# Patient Record
Sex: FEMALE | Race: WHITE | HISPANIC OR LATINO | Employment: UNEMPLOYED | URBAN - METROPOLITAN AREA
[De-identification: names, ages, dates, MRNs, and addresses within clinical notes are randomized per-mention and may not be internally consistent; named-entity substitution may affect disease eponyms.]

---
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
## 2024-10-23 ENCOUNTER — APPOINTMENT (OUTPATIENT)
Dept: CT IMAGING | Facility: HOSPITAL | Age: 29
End: 2024-10-23
Payer: MEDICAID

## 2024-10-23 ENCOUNTER — HOSPITAL ENCOUNTER (EMERGENCY)
Facility: HOSPITAL | Age: 29
Discharge: HOME | End: 2024-10-24
Admitting: EMERGENCY MEDICINE
Payer: MEDICAID

## 2024-10-23 ENCOUNTER — APPOINTMENT (OUTPATIENT)
Dept: GENERAL RADIOLOGY | Facility: HOSPITAL | Age: 29
End: 2024-10-23
Payer: MEDICAID

## 2024-10-23 DIAGNOSIS — K76.0 HEPATIC STEATOSIS: ICD-10-CM

## 2024-10-23 DIAGNOSIS — R74.8 ELEVATED LIVER ENZYMES: ICD-10-CM

## 2024-10-23 DIAGNOSIS — R10.84 GENERALIZED ABDOMINAL PAIN: Primary | ICD-10-CM

## 2024-10-23 DIAGNOSIS — N93.9 ABNORMAL VAGINAL BLEEDING: ICD-10-CM

## 2024-10-23 DIAGNOSIS — N39.0 URINARY TRACT INFECTION WITHOUT HEMATURIA, SITE UNSPECIFIED: ICD-10-CM

## 2024-10-23 DIAGNOSIS — D72.829 LEUKOCYTOSIS, UNSPECIFIED TYPE: ICD-10-CM

## 2024-10-23 LAB
ABO GROUP BLD: NORMAL
ALBUMIN SERPL-MCNC: 4.7 G/DL (ref 3.5–5.2)
ALBUMIN/GLOB SERPL: 2 G/DL
ALP SERPL-CCNC: 67 U/L (ref 39–117)
ALT SERPL W P-5'-P-CCNC: 48 U/L (ref 1–33)
ANION GAP SERPL CALCULATED.3IONS-SCNC: 11.7 MMOL/L (ref 5–15)
AST SERPL-CCNC: 36 U/L (ref 1–32)
BACTERIA UR QL AUTO: ABNORMAL /HPF
BASOPHILS # BLD AUTO: 0.04 10*3/MM3 (ref 0–0.2)
BASOPHILS NFR BLD AUTO: 0.3 % (ref 0–1.5)
BILIRUB SERPL-MCNC: 0.2 MG/DL (ref 0–1.2)
BILIRUB UR QL STRIP: NEGATIVE
BLD GP AB SCN SERPL QL: NEGATIVE
BUN SERPL-MCNC: 9 MG/DL (ref 6–20)
BUN/CREAT SERPL: 11.8 (ref 7–25)
CALCIUM SPEC-SCNC: 10 MG/DL (ref 8.6–10.5)
CHLORIDE SERPL-SCNC: 106 MMOL/L (ref 98–107)
CLARITY UR: CLEAR
CO2 SERPL-SCNC: 22.3 MMOL/L (ref 22–29)
COLOR UR: YELLOW
CREAT SERPL-MCNC: 0.76 MG/DL (ref 0.57–1)
DEPRECATED RDW RBC AUTO: 43 FL (ref 37–54)
EGFRCR SERPLBLD CKD-EPI 2021: 108.9 ML/MIN/1.73
EOSINOPHIL # BLD AUTO: 0.18 10*3/MM3 (ref 0–0.4)
EOSINOPHIL NFR BLD AUTO: 1.5 % (ref 0.3–6.2)
ERYTHROCYTE [DISTWIDTH] IN BLOOD BY AUTOMATED COUNT: 12.9 % (ref 12.3–15.4)
GLOBULIN UR ELPH-MCNC: 2.4 GM/DL
GLUCOSE SERPL-MCNC: 86 MG/DL (ref 65–99)
GLUCOSE UR STRIP-MCNC: NEGATIVE MG/DL
HCG INTACT+B SERPL-ACNC: <1 MIU/ML
HCT VFR BLD AUTO: 47 % (ref 34–46.6)
HGB BLD-MCNC: 16.1 G/DL (ref 12–15.9)
HGB UR QL STRIP.AUTO: NEGATIVE
HOLD SPECIMEN: NORMAL
HOLD SPECIMEN: NORMAL
HYALINE CASTS UR QL AUTO: ABNORMAL /LPF
IMM GRANULOCYTES # BLD AUTO: 0.03 10*3/MM3 (ref 0–0.05)
IMM GRANULOCYTES NFR BLD AUTO: 0.2 % (ref 0–0.5)
INR PPP: 0.96 (ref 0.93–1.1)
KETONES UR QL STRIP: NEGATIVE
LEUKOCYTE ESTERASE UR QL STRIP.AUTO: ABNORMAL
LYMPHOCYTES # BLD AUTO: 3.51 10*3/MM3 (ref 0.7–3.1)
LYMPHOCYTES NFR BLD AUTO: 28.6 % (ref 19.6–45.3)
MCH RBC QN AUTO: 31 PG (ref 26.6–33)
MCHC RBC AUTO-ENTMCNC: 34.3 G/DL (ref 31.5–35.7)
MCV RBC AUTO: 90.4 FL (ref 79–97)
MONOCYTES # BLD AUTO: 0.76 10*3/MM3 (ref 0.1–0.9)
MONOCYTES NFR BLD AUTO: 6.2 % (ref 5–12)
NEUTROPHILS NFR BLD AUTO: 63.2 % (ref 42.7–76)
NEUTROPHILS NFR BLD AUTO: 7.74 10*3/MM3 (ref 1.7–7)
NITRITE UR QL STRIP: NEGATIVE
NRBC BLD AUTO-RTO: 0 /100 WBC (ref 0–0.2)
PH UR STRIP.AUTO: 8 [PH] (ref 5–8)
PLATELET # BLD AUTO: 265 10*3/MM3 (ref 140–450)
PMV BLD AUTO: 11.4 FL (ref 6–12)
POTASSIUM SERPL-SCNC: 3.8 MMOL/L (ref 3.5–5.2)
PROT SERPL-MCNC: 7.1 G/DL (ref 6–8.5)
PROT UR QL STRIP: NEGATIVE
PROTHROMBIN TIME: 10.5 SECONDS (ref 9.6–11.7)
RBC # BLD AUTO: 5.2 10*6/MM3 (ref 3.77–5.28)
RBC # UR STRIP: ABNORMAL /HPF
REF LAB TEST METHOD: ABNORMAL
RH BLD: POSITIVE
SODIUM SERPL-SCNC: 140 MMOL/L (ref 136–145)
SP GR UR STRIP: 1.01 (ref 1–1.03)
SQUAMOUS #/AREA URNS HPF: ABNORMAL /HPF
T&S EXPIRATION DATE: NORMAL
UROBILINOGEN UR QL STRIP: ABNORMAL
WBC # UR STRIP: ABNORMAL /HPF
WBC NRBC COR # BLD AUTO: 12.26 10*3/MM3 (ref 3.4–10.8)
WHOLE BLOOD HOLD COAG: NORMAL
WHOLE BLOOD HOLD SPECIMEN: NORMAL

## 2024-10-23 PROCEDURE — 99285 EMERGENCY DEPT VISIT HI MDM: CPT

## 2024-10-23 PROCEDURE — 86900 BLOOD TYPING SEROLOGIC ABO: CPT

## 2024-10-23 PROCEDURE — 25010000002 ONDANSETRON PER 1 MG

## 2024-10-23 PROCEDURE — P9612 CATHETERIZE FOR URINE SPEC: HCPCS

## 2024-10-23 PROCEDURE — 25010000002 MORPHINE PER 10 MG

## 2024-10-23 PROCEDURE — 81001 URINALYSIS AUTO W/SCOPE: CPT

## 2024-10-23 PROCEDURE — 85610 PROTHROMBIN TIME: CPT

## 2024-10-23 PROCEDURE — 87088 URINE BACTERIA CULTURE: CPT

## 2024-10-23 PROCEDURE — 96374 THER/PROPH/DIAG INJ IV PUSH: CPT

## 2024-10-23 PROCEDURE — 86901 BLOOD TYPING SEROLOGIC RH(D): CPT

## 2024-10-23 PROCEDURE — 87186 SC STD MICRODIL/AGAR DIL: CPT

## 2024-10-23 PROCEDURE — 25510000001 IOPAMIDOL PER 1 ML

## 2024-10-23 PROCEDURE — 80053 COMPREHEN METABOLIC PANEL: CPT

## 2024-10-23 PROCEDURE — 71045 X-RAY EXAM CHEST 1 VIEW: CPT

## 2024-10-23 PROCEDURE — 86850 RBC ANTIBODY SCREEN: CPT

## 2024-10-23 PROCEDURE — 87086 URINE CULTURE/COLONY COUNT: CPT

## 2024-10-23 PROCEDURE — 96375 TX/PRO/DX INJ NEW DRUG ADDON: CPT

## 2024-10-23 PROCEDURE — 84702 CHORIONIC GONADOTROPIN TEST: CPT

## 2024-10-23 PROCEDURE — 71250 CT THORAX DX C-: CPT

## 2024-10-23 PROCEDURE — 85025 COMPLETE CBC W/AUTO DIFF WBC: CPT

## 2024-10-23 PROCEDURE — 74177 CT ABD & PELVIS W/CONTRAST: CPT

## 2024-10-23 RX ORDER — SODIUM CHLORIDE 0.9 % (FLUSH) 0.9 %
10 SYRINGE (ML) INJECTION AS NEEDED
Status: DISCONTINUED | OUTPATIENT
Start: 2024-10-23 | End: 2024-10-24 | Stop reason: HOSPADM

## 2024-10-23 RX ORDER — IOPAMIDOL 755 MG/ML
100 INJECTION, SOLUTION INTRAVASCULAR
Status: COMPLETED | OUTPATIENT
Start: 2024-10-23 | End: 2024-10-23

## 2024-10-23 RX ORDER — ONDANSETRON 2 MG/ML
4 INJECTION INTRAMUSCULAR; INTRAVENOUS ONCE
Status: COMPLETED | OUTPATIENT
Start: 2024-10-23 | End: 2024-10-23

## 2024-10-23 RX ADMIN — IOPAMIDOL 100 ML: 755 INJECTION, SOLUTION INTRAVENOUS at 23:02

## 2024-10-23 RX ADMIN — MORPHINE SULFATE 4 MG: 4 INJECTION, SOLUTION INTRAMUSCULAR; INTRAVENOUS at 23:08

## 2024-10-23 RX ADMIN — ONDANSETRON 4 MG: 2 INJECTION, SOLUTION INTRAMUSCULAR; INTRAVENOUS at 23:08

## 2024-10-24 VITALS
SYSTOLIC BLOOD PRESSURE: 120 MMHG | OXYGEN SATURATION: 97 % | HEART RATE: 73 BPM | RESPIRATION RATE: 18 BRPM | DIASTOLIC BLOOD PRESSURE: 72 MMHG | TEMPERATURE: 98.2 F | HEIGHT: 62 IN | WEIGHT: 216.27 LBS | BODY MASS INDEX: 39.8 KG/M2

## 2024-10-24 RX ORDER — NORGESTIMATE AND ETHINYL ESTRADIOL 7DAYSX3 28
1 KIT ORAL DAILY
Qty: 360 TABLET | Refills: 0 | Status: SHIPPED | OUTPATIENT
Start: 2024-10-24 | End: 2025-10-24

## 2024-10-24 RX ORDER — NICOTINE 21 MG/24HR
1 PATCH, TRANSDERMAL 24 HOURS TRANSDERMAL
Status: DISCONTINUED | OUTPATIENT
Start: 2024-10-24 | End: 2024-10-24 | Stop reason: HOSPADM

## 2024-10-24 RX ORDER — OXYCODONE HYDROCHLORIDE 5 MG/1
5 TABLET ORAL ONCE
Status: COMPLETED | OUTPATIENT
Start: 2024-10-24 | End: 2024-10-24

## 2024-10-24 RX ORDER — OXYCODONE HYDROCHLORIDE 5 MG/1
5 TABLET ORAL EVERY 6 HOURS PRN
Qty: 12 TABLET | Refills: 0 | Status: SHIPPED | OUTPATIENT
Start: 2024-10-24

## 2024-10-24 RX ORDER — NICOTINE 21 MG/24HR
1 PATCH, TRANSDERMAL 24 HOURS TRANSDERMAL EVERY 24 HOURS
Qty: 14 PATCH | Refills: 0 | Status: SHIPPED | OUTPATIENT
Start: 2024-10-24 | End: 2024-10-25 | Stop reason: SDUPTHER

## 2024-10-24 RX ADMIN — OXYCODONE 5 MG: 5 TABLET ORAL at 00:52

## 2024-10-24 RX ADMIN — NICOTINE 1 PATCH: 14 PATCH, EXTENDED RELEASE TRANSDERMAL at 00:52

## 2024-10-24 RX ADMIN — AMOXICILLIN AND CLAVULANATE POTASSIUM 1 TABLET: 875; 125 TABLET, FILM COATED ORAL at 00:52

## 2024-10-24 NOTE — DISCHARGE INSTRUCTIONS
You were evaluated in the emergency department today for your abnormal vaginal bleeding.  Labs and imaging indicate no known cause.  Incidental findings were fatty liver disease and early lung disease from smoking.  Please stop smoking immediately.  I have prescribed nicotine patches to help with this.  Take all medications as prescribed.    Follow-up with OB/GYN.  Keep your appointment in December however I encourage you to call the number listed below to see if they can get you in sooner.  Can see anyone in their office.  Follow-up with gastroenterology, call the number below to schedule an appointment.    Return to the ER for any new or worsening symptoms.

## 2024-10-24 NOTE — ED PROVIDER NOTES
Subjective   Chief Complaint   Patient presents with    Vaginal Bleeding       History of Present Illness  Patient is a 29-year-old female who reports that she has had vaginal bleeding off and on for 3 months.  Now passing clots the size of ping-pong balls.  Previously she only had 1-2 periods per year however she is having much more than that now.  Reports that she is very tired as well as having some blood pressure issues now.  Reports history of HPV biopsy that was noncancerous however has not seen an OB/GYN in 2 years.  She does have 2 kids reports a tubal ligation.  Reporting that she is having pain and nausea to help with this.  States that she tried to call her OB/GYN to make an appointment however they told her that they could not get her in until December.  Review of Systems  Per HPI  Past Medical History:   Diagnosis Date    Anxiety     Depression     Diabetes mellitus     Gestational       Allergies   Allergen Reactions    Cephalosporins Itching    Hydrocodone Rash       Past Surgical History:   Procedure Laterality Date    BREAST ABSCESS INCISION AND DRAINAGE         Family History   Problem Relation Age of Onset    Alo Parkinson White syndrome Mother     Anxiety disorder Mother     Depression Mother     Alo Parkinson White syndrome Sister         Bipolar    Heart disease Sister        Social History     Socioeconomic History    Marital status:    Tobacco Use    Smoking status: Every Day     Current packs/day: 0.00     Types: Cigarettes     Start date:      Last attempt to quit: 2017     Years since quittin.8    Smokeless tobacco: Never   Substance and Sexual Activity    Alcohol use: Yes     Comment: occ     Drug use: Never    Sexual activity: Yes     Partners: Male           Objective   Physical Exam  Vitals and nursing note reviewed.   Constitutional:       General: She is not in acute distress.     Appearance: Normal appearance. She is obese. She is not ill-appearing,  "toxic-appearing or diaphoretic.   HENT:      Head: Normocephalic and atraumatic.      Right Ear: Ear canal and external ear normal.      Left Ear: Ear canal and external ear normal.      Nose: Nose normal.      Mouth/Throat:      Mouth: Mucous membranes are moist.      Pharynx: Oropharynx is clear.   Eyes:      Extraocular Movements: Extraocular movements intact.      Conjunctiva/sclera: Conjunctivae normal.      Pupils: Pupils are equal, round, and reactive to light.   Cardiovascular:      Rate and Rhythm: Normal rate and regular rhythm.      Pulses: Normal pulses.      Heart sounds: Normal heart sounds.   Pulmonary:      Effort: Pulmonary effort is normal.      Breath sounds: Wheezing present.   Abdominal:      General: Bowel sounds are normal.      Palpations: Abdomen is soft.   Genitourinary:     Vagina: Vaginal discharge present.   Musculoskeletal:         General: Normal range of motion.      Cervical back: Normal range of motion and neck supple.   Skin:     General: Skin is warm and dry.      Capillary Refill: Capillary refill takes less than 2 seconds.   Neurological:      General: No focal deficit present.      Mental Status: She is alert.   Psychiatric:         Mood and Affect: Mood normal.         Behavior: Behavior normal.         Thought Content: Thought content normal.         Judgment: Judgment normal.         Procedures           ED Course      /72   Pulse 73   Temp 98.2 °F (36.8 °C)   Resp 18   Ht 157.5 cm (62\")   Wt 98.1 kg (216 lb 4.3 oz)   SpO2 97%   BMI 39.56 kg/m²   Labs Reviewed   COMPREHENSIVE METABOLIC PANEL - Abnormal; Notable for the following components:       Result Value    ALT (SGPT) 48 (*)     AST (SGOT) 36 (*)     All other components within normal limits    Narrative:     GFR Normal >60  Chronic Kidney Disease <60  Kidney Failure <15     URINALYSIS W/ MICROSCOPIC IF INDICATED (NO CULTURE) - Abnormal; Notable for the following components:    Leuk Esterase, UA Small (1+) " (*)     All other components within normal limits   CBC WITH AUTO DIFFERENTIAL - Abnormal; Notable for the following components:    WBC 12.26 (*)     Hemoglobin 16.1 (*)     Hematocrit 47.0 (*)     Neutrophils, Absolute 7.74 (*)     Lymphocytes, Absolute 3.51 (*)     All other components within normal limits   URINALYSIS, MICROSCOPIC ONLY - Abnormal; Notable for the following components:    WBC, UA 3-5 (*)     All other components within normal limits   PROTIME-INR - Normal   URINE CULTURE   HCG, QUANTITATIVE, PREGNANCY    Narrative:     HCG Ranges by Gestational Age    Females - non-pregnant premenopausal   </= 1mIU/mL HCG  Females - postmenopausal               </= 7mIU/mL HCG    3 Weeks       5.4   -      72 mIU/mL  4 Weeks      10.2   -     708 mIU/mL  5 Weeks       217   -   8,245 mIU/mL  6 Weeks       152   -  32,177 mIU/mL  7 Weeks     4,059   - 153,767 mIU/mL  8 Weeks    31,366   - 149,094 mIU/mL  9 Weeks    59,109   - 135,901 mIU/mL  10 Weeks   44,186   - 170,409 mIU/mL  12 Weeks   27,107   - 201,615 mIU/mL  14 Weeks   24,302   -  93,646 mIU/mL  15 Weeks   12,540   -  69,747 mIU/mL  16 Weeks    8,904   -  55,332 mIU/mL  17 Weeks    8,240   -  51,793 mIU/mL  18 Weeks    9,649   -  55,271 mIU/mL     RAINBOW DRAW    Narrative:     The following orders were created for panel order [place] Draw.  Procedure                               Abnormality         Status                     ---------                               -----------         ------                     Green Top (Gel)[[id number]]                                  Final result               Lavender Top[[id number]]                                     Final result               Gold Top - SST[[id number]]                                   Final result               Light Blue Top[[id number]]                                   Final result                 Please view results for these tests on the individual orders.   TYPE AND SCREEN   CBC AND DIFFERENTIAL     Narrative:     The following orders were created for panel order CBC & Differential.  Procedure                               Abnormality         Status                     ---------                               -----------         ------                     CBC Auto Differential[[id number]]        Abnormal            Final result                 Please view results for these tests on the individual orders.   GREEN TOP   LAVENDER TOP   GOLD TOP - SST   LIGHT BLUE TOP     .eds  CT Chest Without Contrast Diagnostic    Result Date: 10/23/2024  Impression: No suspicious pulmonary nodules. Multiple calcified granulomas seen. Mosaic attenuation of the lungs may be due to small airways disease or may be due to inspiratory phase. Consider follow-up CT in 2-3 months to further evaluate. Few prominent mediastinal lymph nodes, likely reactive. Hepatic steatosis and small left lipid rich adrenal adenoma. Please see CT abdomen pelvis report for full abdominal findings. Electronically Signed: Riaz Whyte MD  10/23/2024 11:33 PM EDT  Workstation ID: [id number]    CT Abdomen Pelvis With Contrast    Result Date: 10/23/2024  Impression: 1.No acute abdominal or pelvic abnormality. 2.Small hiatal hernia. Electronically Signed: Mohsen Valencia MD  10/23/2024 11:15 PM EDT  Workstation ID: [id number]    XR Chest 1 View    Result Date: 10/23/2024  Impression: Vague rounded opacity projecting over left heart border of uncertain etiology. Consider dedicated chest CT for further assessment. Electronically Signed: Bradford Garcia MD  10/23/2024 9:31 PM EDT  Workstation ID: [id number]                                            Medical Decision Making  Patient presented with normal vaginal bleeding.  History obtained from patient.    EKG reviewed: Not indicated    Labs reviewed:   Labs Reviewed   COMPREHENSIVE METABOLIC PANEL - Abnormal; Notable for the following components:       Result Value    ALT (SGPT) 48 (*)     AST (SGOT) 36 (*)     All  other components within normal limits    Narrative:     GFR Normal >60  Chronic Kidney Disease <60  Kidney Failure <15     URINALYSIS W/ MICROSCOPIC IF INDICATED (NO CULTURE) - Abnormal; Notable for the following components:    Leuk Esterase, UA Small (1+) (*)     All other components within normal limits   CBC WITH AUTO DIFFERENTIAL - Abnormal; Notable for the following components:    WBC 12.26 (*)     Hemoglobin 16.1 (*)     Hematocrit 47.0 (*)     Neutrophils, Absolute 7.74 (*)     Lymphocytes, Absolute 3.51 (*)     All other components within normal limits   URINALYSIS, MICROSCOPIC ONLY - Abnormal; Notable for the following components:    WBC, UA 3-5 (*)     All other components within normal limits   PROTIME-INR - Normal   URINE CULTURE   HCG, QUANTITATIVE, PREGNANCY    Narrative:     HCG Ranges by Gestational Age    Females - non-pregnant premenopausal   </= 1mIU/mL HCG  Females - postmenopausal               </= 7mIU/mL HCG    3 Weeks       5.4   -      72 mIU/mL  4 Weeks      10.2   -     708 mIU/mL  5 Weeks       217   -   8,245 mIU/mL  6 Weeks       152   -  32,177 mIU/mL  7 Weeks     4,059   - 153,767 mIU/mL  8 Weeks    31,366   - 149,094 mIU/mL  9 Weeks    59,109   - 135,901 mIU/mL  10 Weeks   44,186   - 170,409 mIU/mL  12 Weeks   27,107   - 201,615 mIU/mL  14 Weeks   24,302   -  93,646 mIU/mL  15 Weeks   12,540   -  69,747 mIU/mL  16 Weeks    8,904   -  55,332 mIU/mL  17 Weeks    8,240   -  51,793 mIU/mL  18 Weeks    9,649   -  55,271 mIU/mL     RAINBOW DRAW    Narrative:     The following orders were created for panel order [place] Draw.  Procedure                               Abnormality         Status                     ---------                               -----------         ------                     Green Top (Gel)[[id number]]                                  Final result               Lavender Top[[id number]]                                     Final result               Gold Top -  SST[[id number]]                                   Final result               Light Blue Top[[id number]]                                   Final result                 Please view results for these tests on the individual orders.   TYPE AND SCREEN   CBC AND DIFFERENTIAL    Narrative:     The following orders were created for panel order CBC & Differential.  Procedure                               Abnormality         Status                     ---------                               -----------         ------                     CBC Auto Differential[[id number]]        Abnormal            Final result                 Please view results for these tests on the individual orders.   GREEN TOP   LAVENDER TOP   GOLD TOP - SST   LIGHT BLUE TOP         Imaging reviewed: CT Chest Without Contrast Diagnostic    Result Date: 10/23/2024  Impression: No suspicious pulmonary nodules. Multiple calcified granulomas seen. Mosaic attenuation of the lungs may be due to small airways disease or may be due to inspiratory phase. Consider follow-up CT in 2-3 months to further evaluate. Few prominent mediastinal lymph nodes, likely reactive. Hepatic steatosis and small left lipid rich adrenal adenoma. Please see CT abdomen pelvis report for full abdominal findings. Electronically Signed: Riza Whyte MD  10/23/2024 11:33 PM EDT  Workstation ID: [id number]    CT Abdomen Pelvis With Contrast    Result Date: 10/23/2024  Impression: 1.No acute abdominal or pelvic abnormality. 2.Small hiatal hernia. Electronically Signed: Mohsen Valencia MD  10/23/2024 11:15 PM EDT  Workstation ID: [id number]    XR Chest 1 View    Result Date: 10/23/2024  Impression: Vague rounded opacity projecting over left heart border of uncertain etiology. Consider dedicated chest CT for further assessment. Electronically Signed: Bradford Garcia MD  10/23/2024 9:31 PM EDT  Workstation ID: [id number]     No relevant internal or external records    Differential diagnosis  considered: Uterine fibroid, tubal pregnancy, PCOS    Patient was treated with morphine Zofran Augmentin and Roxicodone and had improvement in symptoms.    Upon evaluation of patient IV lab and imaging were obtained.  Imaging as above.  CBC shows white count 12.26 and hemoglobin of 16.1.  PT/INR normal hCG less than 1 CMP shows mildly elevated liver enzymes patient is O+ urine culture pending urinalysis does show 1+ leukocytes and 3-5 white blood cells.  As patient is symptomatic with lower abdominal pain and some burning on urination we will treat pending urine culture.  Discussed these results with patient.  As she is trying to get into her primary care provider and/or her OB/GYN and is unsuccessful we will send her home on short course of birth control to help regulate abnormal cycles as she had relief from the abnormal bleeding while on Ortho Tri-Cyclen.  Given short course of pain medication.  Given antibiotics pending urine culture results for urinalysis that she is symptomatic.  Given information on smoking cessation as well as nicotine patches.  Discussed discharge follow-up and return to ED instructions with patient who expresses understanding.  Consideration was given for admission, but the patient was stable for outpatient management as patient was ambulatory, nontoxic, stable, and afebrile.  Exam as above.    Disposition: Discussed need to follow-up diagnostics, including incidental findings.  Discharged with instructions to obtain outpatient follow-up with patient's symptoms and findings, with strict return precautions if patient develops new or worsening symptoms.    This document is intended for medical expert use only. Reading of this document by patients and/or patient's family without participating medical staff guidance may result in misinterpretation and unintended morbidity.  Any interpretation of such data is the responsibility of the patient and/or family member responsible for the patient in  concert with their primary or specialist providers, not to be left for sources of online searches such as [employer], [employer] or similar queries. Relying on these approaches to knowledge may result in misinterpretation, misguided goals of care and even death should patients or family members try recommendations outside of the realm of professional medical care in a supervised inpatient environment.       Final diagnoses:   Generalized abdominal pain   Abnormal vaginal bleeding   Urinary tract infection without hematuria, site unspecified   Elevated liver enzymes   Leukocytosis, unspecified type   Hepatic steatosis       ED Disposition  ED Disposition       ED Disposition   Discharge    Condition   Stable    Comment   --               Daya Santoyo, APRN  [address] [place]  [address]  [place] [address] [phone]          GASTROENTEROLOGY [facility]  [address] [address] [phone]        Megan Ko MD  [facility] [facility] [place] [address] [phone]               Medication List        New Prescriptions      amoxicillin-clavulanate 875-125 MG per tablet  Commonly known as: AUGMENTIN  Take 1 tablet by mouth 2 (Two) Times a Day for 7 days.     nicotine 14 MG/24HR patch  Commonly known as: Nicoderm CQ  Place 1 patch on the skin as directed by provider Daily for 14 days.     norgestimate-ethinyl estradiol 0.18/0.215/0.25 MG-35 MCG per tablet  Commonly known as: ORTHO TRI-CYCLEN,TRINESSA  Take 1 tablet by mouth Daily.     oxyCODONE 5 MG immediate release tablet  Commonly known as: Roxicodone  Take 1 tablet by mouth Every 6 (Six) Hours As Needed for Moderate Pain for up to 12 doses.               Where to Get Your Medications        These medications were sent to [facility] - [place], [place] [address] - [phone]  - [phone] FX  [address]  [place] [address]      Phone: [phone]   amoxicillin-clavulanate 875-125 MG per tablet  nicotine 14 MG/24HR  patch  norgestimate-ethinyl estradiol 0.18/0.215/0.25 MG-35 MCG per tablet  oxyCODONE 5 MG immediate release tablet            Shannan Sheldon, APRN  10/24/24 8962

## 2024-10-25 ENCOUNTER — OFFICE VISIT (OUTPATIENT)
Dept: FAMILY MEDICINE CLINIC | Facility: CLINIC | Age: 29
End: 2024-10-25
Payer: MEDICAID

## 2024-10-25 VITALS
WEIGHT: 218.2 LBS | BODY MASS INDEX: 40.15 KG/M2 | OXYGEN SATURATION: 96 % | HEIGHT: 62 IN | SYSTOLIC BLOOD PRESSURE: 114 MMHG | HEART RATE: 89 BPM | TEMPERATURE: 97.4 F | DIASTOLIC BLOOD PRESSURE: 88 MMHG | RESPIRATION RATE: 18 BRPM

## 2024-10-25 DIAGNOSIS — D35.00 ADRENAL ADENOMA, UNSPECIFIED LATERALITY: ICD-10-CM

## 2024-10-25 DIAGNOSIS — J98.4 CALCIFIED GRANULOMA OF LUNG: ICD-10-CM

## 2024-10-25 DIAGNOSIS — Z76.89 ENCOUNTER TO ESTABLISH CARE: Primary | ICD-10-CM

## 2024-10-25 DIAGNOSIS — F90.9 ATTENTION DEFICIT HYPERACTIVITY DISORDER (ADHD), UNSPECIFIED ADHD TYPE: ICD-10-CM

## 2024-10-25 DIAGNOSIS — R13.10 DYSPHAGIA, UNSPECIFIED TYPE: ICD-10-CM

## 2024-10-25 DIAGNOSIS — N92.1 MENORRHAGIA WITH IRREGULAR CYCLE: ICD-10-CM

## 2024-10-25 DIAGNOSIS — R93.89 OPACITY NOTED ON IMAGING STUDY: ICD-10-CM

## 2024-10-25 DIAGNOSIS — Z72.0 TOBACCO ABUSE: ICD-10-CM

## 2024-10-25 DIAGNOSIS — D22.9 MULTIPLE ATYPICAL SKIN MOLES: ICD-10-CM

## 2024-10-25 DIAGNOSIS — F31.60 BIPOLAR AFFECTIVE DISORDER, CURRENT EPISODE MIXED, CURRENT EPISODE SEVERITY UNSPECIFIED: ICD-10-CM

## 2024-10-25 DIAGNOSIS — K44.9 HIATAL HERNIA: ICD-10-CM

## 2024-10-25 DIAGNOSIS — Z91.09 ENVIRONMENTAL ALLERGIES: ICD-10-CM

## 2024-10-25 DIAGNOSIS — Z91.410 HISTORY OF DOMESTIC PHYSICAL ABUSE IN ADULT: ICD-10-CM

## 2024-10-25 DIAGNOSIS — Z13.29 SCREENING FOR THYROID DISORDER: ICD-10-CM

## 2024-10-25 DIAGNOSIS — G89.29 NECK PAIN, CHRONIC: ICD-10-CM

## 2024-10-25 DIAGNOSIS — K76.0 HEPATIC STEATOSIS: ICD-10-CM

## 2024-10-25 DIAGNOSIS — Z13.21 ENCOUNTER FOR VITAMIN DEFICIENCY SCREENING: ICD-10-CM

## 2024-10-25 DIAGNOSIS — Z11.59 ENCOUNTER FOR HEPATITIS C SCREENING TEST FOR LOW RISK PATIENT: ICD-10-CM

## 2024-10-25 DIAGNOSIS — M54.2 NECK PAIN, CHRONIC: ICD-10-CM

## 2024-10-25 DIAGNOSIS — R59.0 MEDIASTINAL LYMPHADENOPATHY: ICD-10-CM

## 2024-10-25 DIAGNOSIS — N64.4 BREAST PAIN, LEFT: ICD-10-CM

## 2024-10-25 DIAGNOSIS — F41.9 ANXIETY: ICD-10-CM

## 2024-10-25 DIAGNOSIS — Z13.1 SCREENING FOR DIABETES MELLITUS: ICD-10-CM

## 2024-10-25 DIAGNOSIS — F32.A DEPRESSION, UNSPECIFIED DEPRESSION TYPE: ICD-10-CM

## 2024-10-25 DIAGNOSIS — Q17.0 PREAURICULAR SKIN TAG: ICD-10-CM

## 2024-10-25 DIAGNOSIS — L60.8 TOENAIL DEFORMITY: ICD-10-CM

## 2024-10-25 DIAGNOSIS — Z13.220 SCREENING FOR LIPID DISORDERS: ICD-10-CM

## 2024-10-25 PROCEDURE — 99204 OFFICE O/P NEW MOD 45 MIN: CPT

## 2024-10-25 PROCEDURE — 1126F AMNT PAIN NOTED NONE PRSNT: CPT

## 2024-10-25 RX ORDER — CARIPRAZINE 1.5 MG/1
1.5 CAPSULE, GELATIN COATED ORAL
Qty: 30 CAPSULE | Refills: 0 | Status: SHIPPED | OUTPATIENT
Start: 2024-10-25

## 2024-10-25 RX ORDER — NICOTINE 21 MG/24HR
1 PATCH, TRANSDERMAL 24 HOURS TRANSDERMAL EVERY 24 HOURS
Qty: 14 PATCH | Refills: 0 | Status: SHIPPED | OUTPATIENT
Start: 2024-10-25 | End: 2024-11-08

## 2024-10-26 LAB — BACTERIA SPEC AEROBE CULT: ABNORMAL

## 2024-10-26 NOTE — PROGRESS NOTES
Patient urine culture resulted with E. coli. Susceptible to select penicillins, see the report below. Patient was given Rx for amoxicillin-clavulanic acid. Therapy is appropriate coverage. No further follow-up required..    Microbiology Results (last 10 days)       Procedure Component Value - Date/Time    Urine Culture - Urine, Straight Cath [[id number]]  (Abnormal)  (Susceptibility) Collected: 10/23/24 2202    Lab Status: Final result Specimen: Urine from Straight Cath Updated: 10/26/24 1132     Urine Culture 50,000 CFU/mL Escherichia coli    Narrative:      Colonization of the urinary tract without infection is common. Treatment is discouraged unless the patient is symptomatic, pregnant, or undergoing an invasive urologic procedure.    Susceptibility        Escherichia coli      RAFFAELE      Amoxicillin + Clavulanate 4 ug/ml Susceptible      Ampicillin >=32 ug/ml Resistant      Ampicillin + Sulbactam 16 ug/ml Intermediate      Cefazolin <=4 ug/ml Susceptible      Cefepime <=1 ug/ml Susceptible      Ceftazidime <=1 ug/ml Susceptible      Ceftriaxone <=1 ug/ml Susceptible      Gentamicin <=1 ug/ml Susceptible      Levofloxacin <=0.12 ug/ml Susceptible      Nitrofurantoin <=16 ug/ml Susceptible      Piperacillin + Tazobactam <=4 ug/ml Susceptible      Trimethoprim + Sulfamethoxazole <=20 ug/ml Susceptible                                   Yareli Bridges, PharmJOE  10/26/2024 15:38 EDT

## 2024-10-30 LAB
CHOLEST SERPL-MCNC: 227 MG/DL (ref 100–199)
HBA1C MFR BLD: 5.6 % (ref 4.8–5.6)
HCV IGG SERPL QL IA: NON REACTIVE
HDLC SERPL-MCNC: 36 MG/DL
LDLC SERPL CALC-MCNC: 136 MG/DL (ref 0–99)
TRIGL SERPL-MCNC: 306 MG/DL (ref 0–149)
TSH SERPL DL<=0.005 MIU/L-ACNC: 2.52 UIU/ML (ref 0.45–4.5)
VLDLC SERPL CALC-MCNC: 55 MG/DL (ref 5–40)

## 2024-11-27 DIAGNOSIS — F31.60 BIPOLAR AFFECTIVE DISORDER, CURRENT EPISODE MIXED, CURRENT EPISODE SEVERITY UNSPECIFIED: ICD-10-CM

## 2024-11-27 RX ORDER — CARIPRAZINE 1.5 MG/1
1.5 CAPSULE, GELATIN COATED ORAL
Qty: 30 CAPSULE | Refills: 0 | Status: SHIPPED | OUTPATIENT
Start: 2024-11-27

## 2024-11-27 NOTE — TELEPHONE ENCOUNTER
Caller: Mallika Duarte    Relationship: Self    Best call back number: [phone]    Requested Prescriptions:   Requested Prescriptions     Pending Prescriptions Disp Refills    Vraylar 1.5 MG capsule capsule 30 capsule 0     Sig: Take 1 capsule by mouth every night at bedtime.      Pharmacy where request should be sent: [facility] [address] [place] [place] [address] [address] - [phone]  - [phone] FX     Last office visit with prescribing clinician: 10/25/2024   Last telemedicine visit with prescribing clinician: Visit date not found   Next office visit with prescribing clinician: 12/16/2024     Additional details provided by patient: PT HAS 1 DAY LEFT OF MEDICATION.     Does the patient have less than a 3 day supply:  [x] Yes  [] No    Would you like a call back once the refill request has been completed: [] Yes [x] No    Lexi Curtis Rep   11/27/24 10:31 EST

## 2024-12-10 ENCOUNTER — TELEPHONE (OUTPATIENT)
Dept: ORTHOPEDIC SURGERY | Facility: CLINIC | Age: 29
End: 2024-12-10
Payer: MEDICAID

## 2024-12-16 ENCOUNTER — OFFICE VISIT (OUTPATIENT)
Dept: FAMILY MEDICINE CLINIC | Facility: CLINIC | Age: 29
End: 2024-12-16
Payer: MEDICAID

## 2024-12-16 VITALS
HEIGHT: 62 IN | DIASTOLIC BLOOD PRESSURE: 76 MMHG | BODY MASS INDEX: 40.01 KG/M2 | OXYGEN SATURATION: 98 % | SYSTOLIC BLOOD PRESSURE: 116 MMHG | RESPIRATION RATE: 18 BRPM | WEIGHT: 217.4 LBS | TEMPERATURE: 97 F | HEART RATE: 75 BPM

## 2024-12-16 DIAGNOSIS — D75.1 ERYTHROCYTOSIS: ICD-10-CM

## 2024-12-16 DIAGNOSIS — K21.9 GASTROESOPHAGEAL REFLUX DISEASE, UNSPECIFIED WHETHER ESOPHAGITIS PRESENT: ICD-10-CM

## 2024-12-16 DIAGNOSIS — K76.0 HEPATIC STEATOSIS: ICD-10-CM

## 2024-12-16 DIAGNOSIS — R10.12 LUQ ABDOMINAL PAIN: ICD-10-CM

## 2024-12-16 DIAGNOSIS — R30.0 DYSURIA: ICD-10-CM

## 2024-12-16 DIAGNOSIS — F31.60 BIPOLAR AFFECTIVE DISORDER, CURRENT EPISODE MIXED, CURRENT EPISODE SEVERITY UNSPECIFIED: Primary | ICD-10-CM

## 2024-12-16 DIAGNOSIS — D35.02 ADENOMA OF LEFT ADRENAL GLAND: ICD-10-CM

## 2024-12-16 DIAGNOSIS — Z72.0 TOBACCO ABUSE: ICD-10-CM

## 2024-12-16 DIAGNOSIS — J98.4 CALCIFIED GRANULOMA OF LUNG: ICD-10-CM

## 2024-12-16 DIAGNOSIS — R59.0 MEDIASTINAL LYMPHADENOPATHY: ICD-10-CM

## 2024-12-16 LAB
BILIRUB BLD-MCNC: NEGATIVE MG/DL
CLARITY, POC: ABNORMAL
COLOR UR: YELLOW
GLUCOSE UR STRIP-MCNC: NEGATIVE MG/DL
KETONES UR QL: NEGATIVE
LEUKOCYTE EST, POC: NEGATIVE
NITRITE UR-MCNC: NEGATIVE MG/ML
PH UR: 8.5 [PH] (ref 5–8)
PROT UR STRIP-MCNC: ABNORMAL MG/DL
RBC # UR STRIP: NEGATIVE /UL
SP GR UR: 1.02 (ref 1–1.03)
UROBILINOGEN UR QL: ABNORMAL

## 2024-12-16 PROCEDURE — 1126F AMNT PAIN NOTED NONE PRSNT: CPT

## 2024-12-16 PROCEDURE — 99214 OFFICE O/P EST MOD 30 MIN: CPT

## 2024-12-16 NOTE — ASSESSMENT & PLAN NOTE
She reports that Vraylar 1.5 mg has been beneficial but is still experiencing mood swings. The dosage of Vraylar will be increased to 3 mg. She has an upcoming appointment with her psychiatrist on 12/26/2024 to discuss further management of her depression, anxiety, and ADHD. No additional medications for depression will be added at this time as her psychiatrist may adjust her treatment plan.

## 2024-12-16 NOTE — ASSESSMENT & PLAN NOTE
Previous imaging indicated small airway disease and calcified granulomas. She is advised to follow up with the pulmonologist to ensure resolution.

## 2024-12-16 NOTE — ASSESSMENT & PLAN NOTE
She has reduced her smoking from half a pack to a quarter of a pack per day. She is encouraged to continue reducing her tobacco use with the goal of complete cessation.

## 2024-12-16 NOTE — PROGRESS NOTES
Office Note     Name: Mallika Duarte    : 1995     MRN: [id number]     Chief Complaint  Medication Follow up  (Vraylar)    Subjective     History of Present Illness:  Mallika Duarte is a 29 y.o. female who presents today for medication follow up. Patient states that she feels like its working it just needs a little more. She would like to increase to 3 a day.  She is also going to psych doctor for depression, anxiety and adhd the day after acacia.  History of Present Illness    She is going to see Dr. Ratliff, psychiatry.       History of Present Illness  The patient is a 29-year-old female who presents for a follow-up on medication. At the last office visit, she established care and was prescribed Vraylar 1.5 mg. She is here to assess the efficacy of the dose. She has a history of tobacco abuse and was prescribed a 14 mg NicoDerm patch. She also has a history of insomnia and was recommended to take melatonin 10 mg every night.    She reports that Vraylar has been beneficial in managing her mood; however, she continues to experience significant mood swings. She has an upcoming appointment with Dr. Carolina, a psychiatrist, on 2024, to discuss potential treatment options for depression, anxiety, and ADHD. She has not sought mental health care for several years but is now committed to addressing these issues. She recalls being prescribed an additional medication along with Vraylar at [facility], but the name eludes her. She acknowledges experiencing suicidal ideation but has no plans to act on these thoughts, attributing her decision to refrain from self-harm to her roles as a mother and wife and her love for her family. She reports having a robust support system in place.    She has a history of acid reflux and takes Tums for relief. She experiences intermittent sharp abdominal pain below her left ribs. She is awaiting contact from a gastroenterologist regarding a follow-up on her  abdominal CT scan.    She has reduced her smoking from half a pack to a quarter of a pack per day. She is awaiting an appointment with a urologist and has not yet heard from the pulmonologist. She has a history of fatty liver disease and adrenal adenoma. She has been battling urinary tract infections (UTIs) due to prolonged bleeding but reports no urinary issues. She is under the care of [facility] [place] and has met with the nurse practitioner.    Supplemental Information  She has a history of back and neck problems since middle school, which she attributes to poor physical condition. She occasionally experiences palpitations, which she describes as feeling like a skipped heartbeat or murmur. She has significantly reduced her caffeine intake, now consuming only one cup of coffee per day, and abstains from energy drinks. She has a history of heavy alcohol consumption, which she had ceased but has recently resumed social drinking. She reports that her menstrual bleeding has ceased after a duration of 4 to 5 months.    SOCIAL HISTORY  The patient has a history of tobacco abuse and currently smokes a quarter of a pack a day. The patient has a history of heavy alcohol use, quit, but recently started socially drinking again.    MEDICATIONS  Current: Vraylar, NicoDerm patch, melatonin, Tums    Allergies   Allergen Reactions    Cephalosporins Itching    Hydrocodone Rash         Current Outpatient Medications:     nicotine (Nicoderm CQ) 7 MG/24HR patch, Place 1 patch on the skin as directed by provider Daily., Disp: 14 each, Rfl: 2    Cariprazine HCl (Vraylar) 3 MG capsule capsule, Take 1 capsule by mouth Daily., Disp: 30 capsule, Rfl: 2    omeprazole (priLOSEC) 20 MG capsule, Take 1 capsule by mouth Daily., Disp: 30 capsule, Rfl: 0    Review of Systems   Constitutional:  Negative for chills, diaphoresis, fatigue and fever.   HENT:  Negative for congestion, sore throat and swollen glands.    Respiratory:   Negative for cough.    Cardiovascular:  Negative for chest pain.   Gastrointestinal:  Positive for abdominal pain. Negative for nausea and vomiting.   Genitourinary:  Positive for dysuria.   Musculoskeletal:  Positive for myalgias and neck pain.   Skin:  Negative for rash.   Neurological:  Positive for weakness and numbness.   Psychiatric/Behavioral:  Positive for decreased concentration, self-injury (thoughts but no plan.), suicidal ideas (thoughts but no plan. she is a mother and she would not hurt her family.) and depressed mood. Negative for sleep disturbance and stress. The patient is not nervous/anxious.    All other systems reviewed and are negative.      Social History     Socioeconomic History    Marital status:    Tobacco Use    Smoking status: Every Day     Current packs/day: 0.50     Average packs/day: 0.5 packs/day for 18.0 years (9.0 ttl pk-yrs)     Types: Cigarettes     Start date: 2007     Passive exposure: Current    Smokeless tobacco: Never   Vaping Use    Vaping status: Never Used   Substance and Sexual Activity    Alcohol use: Yes     Comment: occ     Drug use: Yes     Types: Marijuana    Sexual activity: Yes     Partners: Male       Family History   Problem Relation Age of Onset    Alo Parkinson White syndrome Mother     Anxiety disorder Mother     Depression Mother     Alo Parkinson White syndrome Sister         Bipolar    Heart disease Sister     Lung cancer Paternal Grandmother            10/25/2024     3:06 PM   PHQ-2/PHQ-9 Depression Screening   Little interest or pleasure in doing things Not at all   Feeling down, depressed, or hopeless Over half   Trouble falling or staying asleep, or sleeping too much Not at all   Feeling tired or having little energy Not at all   Poor appetite or overeating Not at all   Feeling bad about yourself - or that you are a failure or have let yourself or your family down Not at all   Trouble concentrating on things, such as reading the newspaper or  "watching television Not at all   Moving or speaking so slowly that other people could have noticed? Or the opposite - being so fidgety or restless that you have been moving around a lot more than usual. Not at all   Thoughts that you would be better off dead or hurting yourself in some way Not at all   Patient Health Questionnaire-9 Score 2   How difficult have these problems made it for you to do your work, take care of things at home, or get along with other people? Not difficult at all       Fall Risk Screen:  DIOR Fall Risk Assessment has not been completed.      Objective     /76 (BP Location: Right arm, Patient Position: Sitting, Cuff Size: Large Adult)   Pulse 75   Temp 97 °F (36.1 °C) (Temporal)   Resp 18   Ht 157.5 cm (62.01\")   Wt 98.6 kg (217 lb 6.4 oz)   SpO2 98%   BMI 39.75 kg/m²     BP Readings from Last 2 Encounters:   12/16/24 116/76   10/25/24 114/88       Wt Readings from Last 2 Encounters:   12/16/24 98.6 kg (217 lb 6.4 oz)   10/25/24 99 kg (218 lb 3.2 oz)            Physical Exam  Vitals and nursing note reviewed.   Constitutional:       General: She is not in acute distress.     Appearance: Normal appearance. She is well-groomed. She is not ill-appearing.   HENT:      Head: Normocephalic and atraumatic.   Eyes:      General: Lids are normal. Vision grossly intact.   Neck:      Vascular: No carotid bruit.   Cardiovascular:      Rate and Rhythm: Normal rate and regular rhythm.      Heart sounds: Normal heart sounds.   Pulmonary:      Effort: Pulmonary effort is normal.      Breath sounds: Normal breath sounds and air entry.   Abdominal:      General: Abdomen is flat. Bowel sounds are normal. There is no distension.      Palpations: Abdomen is soft. There is no shifting dullness.      Tenderness: There is abdominal tenderness in the epigastric area and left upper quadrant.      Hernia: No hernia is present.          Comments: Slight tenderness at area palpated.   Patient denies yeah " sorry   Musculoskeletal:      Right lower leg: No edema.      Left lower leg: No edema.   Skin:     General: Skin is warm and dry.   Neurological:      Mental Status: She is alert and oriented to person, place, and time. Mental status is at baseline.   Psychiatric:         Mood and Affect: Mood normal.         Behavior: Behavior normal. Behavior is cooperative.         Thought Content: Thought content normal.       Physical Exam   Abdomen   Abdomen comments: Slight tenderness at area palpated.   Patient denies yeah sorry      Physical Exam      Result Review :       Results  Laboratory Studies  A1c was normal. White count, hemoglobin, hematocrit were slightly elevated.    Imaging  CT scan showed fatty liver, adrenal adenoma, lymph nodes, small airway disease, and calcified granulomas.    Assessment and Plan     Plan      Assessment & Plan  Bipolar affective disorder, current episode mixed, current episode severity unspecified    She reports that Vraylar 1.5 mg has been beneficial but is still experiencing mood swings. The dosage of Vraylar will be increased to 3 mg. She has an upcoming appointment with her psychiatrist on 12/26/2024 to discuss further management of her depression, anxiety, and ADHD. No additional medications for depression will be added at this time as her psychiatrist may adjust her treatment plan.       LUQ abdominal pain  Begin medication to treat acid reflux disease.  Will check pancreatic enzymes.  Orders:    Comprehensive metabolic panel    Lipase    Erythrocytosis    Orders:    CBC & Differential    Comprehensive metabolic panel    Peripheral Blood Smear    Dysuria    Orders:    POC Urinalysis Dipstick, Multipro    Gastroesophageal reflux disease, unspecified whether esophagitis present  She experiences sharp abdominal pain below her ribs on the left side, which may be related to GERD. She is advised to avoid known dietary triggers. Omeprazole 20 mg daily has been prescribed for 30 days and  sent to [facility]. If symptoms persist, an EGD may be considered.       Adenoma of left adrenal gland  Previously referred to urology.  Advised patient to continue up with referral.       Hepatic steatosis  Previously referred to gastroenterology.  Advised patient to continue with referral.       Calcified granuloma of lung  Previous imaging indicated small airway disease and calcified granulomas. She is advised to follow up with the pulmonologist to ensure resolution.       Tobacco abuse  She has reduced her smoking from half a pack to a quarter of a pack per day. She is encouraged to continue reducing her tobacco use with the goal of complete cessation.       Mediastinal lymphadenopathy  Previous imaging indicated small airway disease and calcified granulomas. She is advised to follow up with the pulmonologist to ensure resolution.         Assessment & Plan  1. Bipolar disorder.  She reports that Vraylar 1.5 mg has been beneficial but is still experiencing mood swings. The dosage of Vraylar will be increased to 3 mg. She has an upcoming appointment with her psychiatrist on 12/26/2024 to discuss further management of her depression, anxiety, and ADHD. No additional medications for depression will be added at this time as her psychiatrist may adjust her treatment plan.    2. Gastroesophageal reflux disease (GERD).  She experiences sharp abdominal pain below her ribs on the left side, which may be related to GERD. She is advised to avoid known dietary triggers. Omeprazole 20 mg daily has been prescribed for 30 days and sent to [facility] pharmacy. If symptoms persist, an EGD may be considered.    3. Tobacco abuse.  She has reduced her smoking from half a pack to a quarter of a pack per day. She is encouraged to continue reducing her tobacco use with the goal of complete cessation.    4. Alcohol use.  She has resumed social drinking after previously quitting. She is advised that alcohol is detrimental to heart  health and should try to abstain from it.    5. Fatty liver.  Previous imaging indicated a fatty liver.    6. Adrenal adenoma.  Previous imaging indicated an adrenal adenoma.    7. Small airway disease.  Previous imaging indicated small airway disease and calcified granulomas. She is advised to follow up with the pulmonologist to ensure resolution.    8. Health maintenance.  Her A1c levels are within the normal range. A previous urinalysis conducted in the ER in October 2024 revealed abnormal results. Her white blood cell count, hemoglobin, and hematocrit levels were slightly elevated a month ago. A repeat of these tests will be ordered. Additionally, lipase levels will be checked to assess pancreatic function, and a urine test will be conducted.    Follow-up  The patient will follow up in 1 month via MyChart video visit to recheck GERD and follow up on all the referrals.       Follow Up   Wrapup Tab  No follow-ups on file.     Patient was given instructions and counseling regarding her condition or for health maintenance advice. Please see specific information pulled into the AVS if appropriate.  Hand hygiene was performed during entrance to exam room and following assessment of patient. This document is intended for medical expert use only.       BLAS Luz, FNP-C  [facility]   [facility]  [address] [place] [address] DR GENARO TAVERAS [address] [place] [address] [place] [id number] [phone]    Patient or patient representative verbalized consent for the use of Ambient Listening during the visit with  BLAS Luz for chart documentation. 12/16/2024  17:23 EST

## 2025-01-13 ENCOUNTER — TELEMEDICINE (OUTPATIENT)
Dept: FAMILY MEDICINE CLINIC | Facility: CLINIC | Age: 30
End: 2025-01-13
Payer: MEDICAID

## 2025-01-13 DIAGNOSIS — J98.4 CALCIFIED GRANULOMA OF LUNG: ICD-10-CM

## 2025-01-13 DIAGNOSIS — F51.01 PRIMARY INSOMNIA: ICD-10-CM

## 2025-01-13 DIAGNOSIS — K21.9 GASTROESOPHAGEAL REFLUX DISEASE, UNSPECIFIED WHETHER ESOPHAGITIS PRESENT: ICD-10-CM

## 2025-01-13 DIAGNOSIS — Z72.0 TOBACCO ABUSE: ICD-10-CM

## 2025-01-13 DIAGNOSIS — F31.60 BIPOLAR AFFECTIVE DISORDER, CURRENT EPISODE MIXED, CURRENT EPISODE SEVERITY UNSPECIFIED: Primary | ICD-10-CM

## 2025-01-13 DIAGNOSIS — D35.02 ADENOMA OF LEFT ADRENAL GLAND: ICD-10-CM

## 2025-01-13 DIAGNOSIS — K76.0 HEPATIC STEATOSIS: ICD-10-CM

## 2025-01-13 PROCEDURE — 99213 OFFICE O/P EST LOW 20 MIN: CPT

## 2025-01-13 PROCEDURE — 1126F AMNT PAIN NOTED NONE PRSNT: CPT
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
## 2025-01-13 NOTE — ASSESSMENT & PLAN NOTE
Referred to Urology - she does not have the number. Number given to patient via [employer] and via video visit.

## 2025-01-13 NOTE — ASSESSMENT & PLAN NOTE
Encouraged continued smoking decrease and cessation.   Has not smoked a cigarette in two days and is using the patches.

## 2025-02-19 ENCOUNTER — OFFICE VISIT (OUTPATIENT)
Dept: PSYCHIATRY | Facility: CLINIC | Age: 30
End: 2025-02-19
Payer: MEDICAID

## 2025-02-19 ENCOUNTER — PATIENT ROUNDING (BHMG ONLY) (OUTPATIENT)
Dept: PSYCHIATRY | Facility: CLINIC | Age: 30
End: 2025-02-19
Payer: MEDICAID

## 2025-02-19 DIAGNOSIS — F31.62 BIPOLAR DISORDER, CURRENT EPISODE MIXED, MODERATE: Primary | Chronic | ICD-10-CM

## 2025-02-19 DIAGNOSIS — F90.0 ATTENTION DEFICIT HYPERACTIVITY DISORDER (ADHD), PREDOMINANTLY INATTENTIVE TYPE: Chronic | ICD-10-CM

## 2025-02-19 DIAGNOSIS — F43.12 CHRONIC POSTTRAUMATIC STRESS DISORDER: Chronic | ICD-10-CM

## 2025-02-19 DIAGNOSIS — F41.1 GENERALIZED ANXIETY DISORDER: Chronic | ICD-10-CM

## 2025-02-19 PROCEDURE — 90792 PSYCH DIAG EVAL W/MED SRVCS: CPT | Performed by: PSYCHIATRY & NEUROLOGY

## 2025-02-19 PROCEDURE — 1159F MED LIST DOCD IN RCRD: CPT | Performed by: PSYCHIATRY & NEUROLOGY

## 2025-02-19 PROCEDURE — 1160F RVW MEDS BY RX/DR IN RCRD: CPT | Performed by: PSYCHIATRY & NEUROLOGY

## 2025-02-19 RX ORDER — ATOMOXETINE 60 MG/1
60 CAPSULE ORAL DAILY
Qty: 30 CAPSULE | Refills: 0 | Status: SHIPPED | OUTPATIENT
Start: 2025-02-19 | End: 2026-02-19

## 2025-02-19 RX ORDER — ATOMOXETINE 40 MG/1
40 CAPSULE ORAL DAILY
Qty: 5 CAPSULE | Refills: 0 | Status: SHIPPED | OUTPATIENT
Start: 2025-02-19 | End: 2025-02-24

## 2025-02-19 NOTE — PROGRESS NOTES
A My-chart message has been sent to the patient for PATIENT ROUNDING with [facility].
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
## 2025-03-18 ENCOUNTER — OFFICE VISIT (OUTPATIENT)
Dept: PSYCHIATRY | Facility: CLINIC | Age: 30
End: 2025-03-18
Payer: MEDICAID

## 2025-03-18 DIAGNOSIS — F41.1 GENERALIZED ANXIETY DISORDER: Chronic | ICD-10-CM

## 2025-03-18 DIAGNOSIS — F31.62 BIPOLAR DISORDER, CURRENT EPISODE MIXED, MODERATE: Chronic | ICD-10-CM

## 2025-03-18 DIAGNOSIS — F31.64 BIPOLAR DISORDER, CURRENT EPISODE MIXED, SEVERE, WITH PSYCHOTIC FEATURES: Primary | Chronic | ICD-10-CM

## 2025-03-18 DIAGNOSIS — F43.12 CHRONIC POSTTRAUMATIC STRESS DISORDER: Chronic | ICD-10-CM

## 2025-03-18 DIAGNOSIS — F90.0 ATTENTION DEFICIT HYPERACTIVITY DISORDER (ADHD), PREDOMINANTLY INATTENTIVE TYPE: Chronic | ICD-10-CM

## 2025-03-18 RX ORDER — ARIPIPRAZOLE 5 MG/1
5 TABLET ORAL DAILY
Qty: 30 TABLET | Refills: 0 | Status: SHIPPED | OUTPATIENT
Start: 2025-03-18
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
